# Patient Record
Sex: MALE | Race: WHITE | Employment: FULL TIME | URBAN - METROPOLITAN AREA
[De-identification: names, ages, dates, MRNs, and addresses within clinical notes are randomized per-mention and may not be internally consistent; named-entity substitution may affect disease eponyms.]

---

## 2022-11-03 ENCOUNTER — TELEPHONE (OUTPATIENT)
Dept: ONCOLOGY | Age: 57
End: 2022-11-03

## 2022-11-03 ENCOUNTER — INITIAL CONSULT (OUTPATIENT)
Dept: ONCOLOGY | Age: 57
End: 2022-11-03
Payer: MEDICARE

## 2022-11-03 VITALS
BODY MASS INDEX: 24.98 KG/M2 | DIASTOLIC BLOOD PRESSURE: 76 MMHG | WEIGHT: 184.4 LBS | SYSTOLIC BLOOD PRESSURE: 112 MMHG | HEIGHT: 72 IN | TEMPERATURE: 98 F | HEART RATE: 83 BPM

## 2022-11-03 DIAGNOSIS — C21.0 ANAL CARCINOMA (HCC): ICD-10-CM

## 2022-11-03 DIAGNOSIS — C78.7 LIVER METASTASES (HCC): Primary | ICD-10-CM

## 2022-11-03 PROCEDURE — 99245 OFF/OP CONSLTJ NEW/EST HI 55: CPT | Performed by: INTERNAL MEDICINE

## 2022-11-03 RX ORDER — LIDOCAINE AND PRILOCAINE 25; 25 MG/G; MG/G
CREAM TOPICAL
COMMUNITY
Start: 2021-12-29

## 2022-11-03 RX ORDER — CAPSAICIN 0.025 %
CREAM (GRAM) TOPICAL 2 TIMES DAILY
COMMUNITY
Start: 2022-08-18 | End: 2022-11-03 | Stop reason: CLARIF

## 2022-11-03 RX ORDER — DIPHENOXYLATE HYDROCHLORIDE AND ATROPINE SULFATE 2.5; .025 MG/1; MG/1
TABLET ORAL
COMMUNITY
Start: 2022-10-20

## 2022-11-03 RX ORDER — LOSARTAN POTASSIUM 100 MG/1
TABLET ORAL
COMMUNITY
Start: 2022-10-17

## 2022-11-03 RX ORDER — CARVEDILOL 25 MG/1
TABLET ORAL
COMMUNITY
Start: 2022-10-21

## 2022-11-03 RX ORDER — HYDROXYZINE HYDROCHLORIDE 10 MG/1
TABLET, FILM COATED ORAL 3 TIMES DAILY PRN
COMMUNITY

## 2022-11-03 RX ORDER — OXYCODONE HYDROCHLORIDE 5 MG/1
TABLET ORAL
COMMUNITY
Start: 2022-10-20

## 2022-11-03 RX ORDER — POLYETHYLENE GLYCOL 3350 17 G/17G
POWDER, FOR SOLUTION ORAL DAILY
COMMUNITY
Start: 2022-01-03

## 2022-11-03 RX ORDER — CAPSAICIN 0.025 %
CREAM (GRAM) TOPICAL
COMMUNITY
Start: 2022-08-19

## 2022-11-03 RX ORDER — BUSPIRONE HYDROCHLORIDE 15 MG/1
TABLET ORAL PRN
COMMUNITY
Start: 2021-09-07

## 2022-11-03 RX ORDER — MOMETASONE FUROATE 1 MG/G
CREAM TOPICAL DAILY
COMMUNITY
Start: 2022-09-29

## 2022-11-03 RX ORDER — ONDANSETRON HYDROCHLORIDE 8 MG/1
TABLET, FILM COATED ORAL
COMMUNITY
Start: 2022-10-17

## 2022-11-03 RX ORDER — LOPERAMIDE HYDROCHLORIDE 2 MG/1
CAPSULE ORAL
COMMUNITY
Start: 2022-08-18

## 2022-11-03 NOTE — PROGRESS NOTES
Patient ID: Callum Burciaga, 1965, [id number], 62 y.o. Referred by :   Reason for consultation:   Anal Carcinoma stage IV, with liver metastasis. Anal cancer (CMS-HCC)   1/6/2022 - Cancer Staged   Staging form: Anus, AJCC 8th Edition  - Clinical: Stage IV (cTX, cNX, pM1) - Signed by Ramon Moscoso MD on 1/6/2022   Progressive disease and plan to start FOLFOX  HISTORY OF PRESENT ILLNESS:    Oncologic History:  Callum Burciaga is a 62 y.o. male with a diagnosis of stage IV anal carcinoma was seen during initial consultation visit for second opinion. Patient has been following with medical oncology Dr. Rosa Lange at [facility].    His brief oncologic history is as follows. Patient was diagnosed with anal squamous cell carcinoma in December 2021 when he was noted to have low rectal mass with possible prostatic invasion and also his CT scan showed multiple liver metastasis, perirectal lymph nodes and adrenal metastasis. Biopsy of the anal mass in December showed squamous cell carcinoma. Subsequently patient also underwent biopsy of the liver on 1/4/2021 which confirmed metastatic squamous cell carcinoma from anal primary. The next-generation sequencing identified no targetable mutations and his MSI was stable and his tumor mutational burden low at 5 mutations/MB. Patient was started on carboplatin and Taxol every 3 weeks on 1/10/2022 and subsequent restaging scan showed good response in March. Because of the neuropathy his chemotherapy was dose reduced at cycle 5 and patient completed cycle 6 on 5/4/2022. His restaging scan in May showed stable disease. His CT abdomen pelvis on 7/22/2022 showed enlarged periaortic rectal lymph node compared to the May scan and possible 1 new liver lesion. Patient was started on Keytruda on 7/28/2022 and he received 4 cycles so far.   His restaging scan on 10/12/2022 showed Increased lymphadenopathy and size/number of hepatic lesions, concerning for progression of metastatic disease. Patient received palliative radiation therapy 1800 cGy to primary anal mass starting 10/5/2022 until 10/12/2022. His primary oncologist recommended FOLFOX    Patient works as a  in [employer] and is active physically. His ECOG performance around 1. He does complain of fatigue and pain in his anal area especially with bowel movements and also pain in the right upper quadrant which is started recently. He does not like to take pain medication and using oxycodone 5 mg only 2-3 times a day. He is not on any long-acting pain medications. He does smoke half pack per day and denies any excessive alcohol use. He does have some numbness in his bilateral feet but denies any painful neuropathy. He was prescribed Cymbalta by his oncologist but he took for 1 days and then subsequently stopped because of the side effects.     Past Medical History:   Diagnosis Date    Anal cancer ([facility]     Cardiomyopathy ([facility]     Depression     Hypertension     Liver cancer ([facility]        Past Surgical History:   Procedure Laterality Date    COLECTOMY      COLONOSCOPY W/ BIOPSIES      LIVER BIOPSY         No Known Allergies    Current Outpatient Medications   Medication Sig Dispense Refill    busPIRone (BUSPAR) 15 MG tablet Take by mouth as needed      capsaicin (ZOSTRIX) 0.025 % cream       carvedilol (COREG) 25 MG tablet       diphenoxylate-atropine (LOMOTIL) 2.5-0.025 MG per tablet       hydrocortisone 2.5 % cream Place rectally 2 times daily      hydrOXYzine HCl (ATARAX) 10 MG tablet Take by mouth 3 times daily as needed      lidocaine-prilocaine (EMLA) 2.5-2.5 % cream Apply small mound to port site 1 hr prior to accessing      loperamide (IMODIUM) 2 MG capsule       losartan (COZAAR) 100 MG tablet       mometasone (ELOCON) 0.1 % cream Apply topically daily      ondansetron (ZOFRAN) 8 MG tablet       oxyCODONE (ROXICODONE) 5 MG immediate release tablet       polyethylene glycol (GLYCOLAX) 17 GM/SCOOP powder Take by mouth daily      terbinafine (LAMISIL) 1 % cream Apply topically 2 times daily       No current facility-administered medications for this visit. Social History     Socioeconomic History    Marital status: Single     Spouse name: Not on file    Number of children: Not on file    Years of education: Not on file    Highest education level: Not on file   Occupational History    Not on file   Tobacco Use    Smoking status: Every Day     Packs/day: 0.25     Types: Cigarettes    Smokeless tobacco: Never   Substance and Sexual Activity    Alcohol use: Not Currently    Drug use: Never    Sexual activity: Not on file   Other Topics Concern    Not on file   Social History Narrative    Not on file     Social Determinants of Health     Financial Resource Strain: Not on file   Food Insecurity: Not on file   Transportation Needs: Not on file   Physical Activity: Not on file   Stress: Not on file   Social Connections: Not on file   Intimate Partner Violence: Not on file   Housing Stability: Not on file       Family History   Problem Relation Age of Onset    Heart Attack Mother     Heart Attack Father         REVIEW OF SYSTEM:     Constitutional: ++fatigue, No fever or chills. No night sweats, no weight loss   Eyes: No eye discharge, double vision, or eye pain   HEENT: negative for sore mouth, sore throat, hoarseness and voice change   Respiratory: negative for cough , sputum, dyspnea, wheezing, hemoptysis, chest pain   Cardiovascular: negative for chest pain, dyspnea, palpitations, orthopnea, PND   Gastrointestinal: negative for nausea, vomiting, diarrhea, constipation, ++abdominal pain, Dysphagia, hematemesis and hematochezia, +rectal pain  Genitourinary: negative for frequency, dysuria, nocturia, urinary incontinence, and hematuria   Integument: negative for rash, skin lesions, bruises.    Hematologic/Lymphatic: negative for easy bruising, bleeding, lymphadenopathy, petechiae and swelling/edema   Endocrine: negative for heat or cold intolerance, tremor, weight changes, change in bowel habits and hair loss   Musculoskeletal: negative for myalgias, arthralgias, pain, joint swelling,and bone pain   Neurological: negative for headaches, dizziness, seizures, weakness, numbness   OBJECTIVE:         Vitals:    11/03/22 0819   BP: 112/76   Pulse: 83   Temp: 98 °F (36.7 °C)     PHYSICAL EXAM:   General appearance - well appearing, no in pain or distress   Mental status - alert and cooperative   Eyes - pupils equal and reactive, extraocular eye movements intact   Ears - bilateral TM's and external ear canals normal   Mouth - mucous membranes moist, pharynx normal without lesions   Neck - supple, no significant adenopathy   Lymphatics - no palpable lymphadenopathy, no hepatosplenomegaly   Chest - clear to auscultation, no wheezes, rales or rhonchi, symmetric air entry   Heart - normal rate, regular rhythm, normal S1, S2, no murmurs, rubs, clicks or gallops   Abdomen - soft, +RUQ tender, nondistended, no masses or organomegaly   Neurological - alert, oriented, normal speech, no focal findings or movement disorder noted   Musculoskeletal - no joint tenderness, deformity or swelling   Extremities - peripheral pulses normal, no pedal edema, no clubbing or cyanosis   Skin - normal coloration and turgor, no rashes, no suspicious skin lesions noted   LABORATORY DATA:     Lab Results   Component Value Date    WBC 16.4 (H) 02/27/2012    HGB 10.1 (L) 02/27/2012    HCT 28.7 (L) 02/27/2012    MCV 93.0 02/27/2012     02/27/2012    LYMPHOPCT 3 (L) 02/24/2012    RBC 3.09 (L) 02/27/2012    MCH 32.7 02/27/2012    MCHC 35.2 02/27/2012    RDW 14.6 (H) 02/27/2012    MONOPCT 7 02/24/2012    BASOPCT 0 02/24/2012    NEUTROABS 15.79 (H) 02/24/2012    LYMPHSABS 0.43 (L) 02/24/2012    MONOSABS 1.30 (H) 02/24/2012    EOSABS 0.00 02/24/2012    BASOSABS 0.00 02/24/2012         Chemistry        Component Value Date/Time     02/29/2012 0556    K 4.3 02/29/2012 0556     02/29/2012 0556    CO2 27 02/29/2012 0556    BUN 19 02/29/2012 0556    CREATININE 0.71 02/29/2012 0556        Component Value Date/Time    CALCIUM 8.6 02/29/2012 0556    ALKPHOS 90 02/29/2012 0556    AST 47 (H) 02/29/2012 0556     (H) 02/29/2012 0556    BILITOT 0.60 02/29/2012 0556        PATHOLOGY DATA:   Reviewed  IMAGING DATA:    10/12/22 CT CHEST WITH CONTRAST     HISTORY:  Metastatic anal cancer. Assess response to therapy Staging restaging     COMPARISON: 7/22/2022     TECHNIQUE:  Omnipaque 300 nonionic contrast injected intravenously without reported complication. Axial images of the thorax obtained with sagittal and coronal 2-D reformatted images. Automated exposure control was used     FINDINGS:     5 mm oval-shaped nodule in the extreme right lower lobe is unchanged. Please see axial image 96. Triangular-shaped area of pleural thickening along the inferior left major fissure is also stable. This measures 6 mm. Scarring in the lingula is noted. Tiny 2 mm right middle lobe nodule is stable. See axial image 76. Changes related emphysema are appreciated below are noted. Atherosclerotic involvement of the coronary arteries is again noted. Small size pericardial effusion is appreciated anteriorly. Extensive hepatic metastatic disease is noted. Atherosclerotic disease is observed. No lymphadenopathy in the superior mediastinum is appreciated. Please see axial   image 32. This lymph node has a waist of 1.2 cm. Additional lymph nodes along the ascending aorta are appreciated     IMPRESSION:     *  Concern is raised for new metastatic lymphadenopathy in the superior mediastinum as detailed above. Consider further staging studies such as PET/CT     10/12/22 CT ABDOMEN AND PELVIS W CONT     HISTORY: Anal cancer. Surveillance. Restaging.    COMPARISON: 7/22/2022   TECHNIQUE: CT images of abdomen and pelvis obtained following administration of contrast. Automated exposure control was utilized. All CT scans at this facility use dose modulation, iterative reconstruction, and/or weight based dosing when appropriate to reduce radiation dose to as low as reasonably achievable. CONTRAST:   Oral: None   IV: 100 mL Omnipaque 350     FINDINGS:   No lower lung consolidations. No pleural effusion. No abdominal wall abnormalities. No acute osseous abnormalities. The visualized portions of the heart and pericardium are unremarkable. Moderate vascular calcifications in the abdominal aorta and iliac arteries. No aneurysms. Cirrhotic morphology of the liver. Innumerable hypoattenuating lesions throughout the liver have increased in size and number. The gallbladder, adrenal glands, spleen, and pancreas are unremarkable. The kidneys and ureters are unremarkable. The urinary bladder is under distended, but demonstrates diffusely thickened wall. Calcifications within the prostate. Status post partial colectomy. The small bowel is unremarkable. No evidence of intra-abdominal free air or bowel obstruction. Right inguinal lymph nodes have increased in size, the largest of which measures 1.3 cm in short axis (series 3, image 85). Unchanged left inguinal lymph nodes measuring up to 0.8 cm in short axis (series 3, image 93). Increased size of chuck hepatis lymph node now measuring 1.0 cm in short axis   (series 3, image 32). Retrocrural lymph nodes also increased in size and now measure up to 0.9 cm in short axis (series 3, image 20). Unchanged perirectal lymph nodes measure up to 1.0 cm in short axis (series 3, image 75). IMPRESSION:   Increased lymphadenopathy and size/number of hepatic lesions, concerning for progression of metastatic disease. ASSESSMENT:    In total, I spent greater than 80 minutes in face-to-face consultation with the patient and the majority of this time was spent in education, counseling, and coordination of care.   During our encounter, I personally reviewed the above history and evaluation, including radiology and pathology data, in detail  Lizzette Herrera is a 62 y.o. male with stage IV anal squamous cell carcinoma with progressive liver metastasis after multiple lines of treatment. I reviewed outside records, discussed NCCN guidelines and goals of care with patient. Is maintaining good performance status and still working. And therefore he is a candidate for aggressive chemotherapy. I explained that systemic chemotherapy is palliative in nature and is not curable with a goal of maintaining quality of life and in the process to help improve survival.    His next-generation sequencing did not show any targetable mutation and his MSI stable and tumor mutational burden is not high therefore immunotherapy by itself has less chance of working. I explained that with each line of chemotherapy treatment response rate does goes down. I discussed  Option of treatment including FOLFCIS, DCF and m FOLFOX with Avastin. Given that patient has mild renal dysfunction he may not be appropriate for cisplatin and therefore modified FOLFOX regimen would be reasonable. I would recommend adding Avastin based on (Mulu rizzo l, Case Rep Oncol. 2016 Maria Alejandra Townsend; 9(1): [phone]) and NCCN rolando. Patient's questions were sought and answered to his satisfaction and he agreed to proceed with the plan.   PLAN:   I reviewed laboratory data, imaging studies, discussed NCCN guidelines goals of care  I explained that in my opinion modified FOLFOX regimen is reasonable option and may add Avastin  Given his pre-existing neuropathy the oxaliplatin dose probably need to be watched and slightly reduced  Also will recommend adding Lyrica for his neuropathy  Patient could follow-up with his primary oncologist for further care  I also explained option of exploring clinical trial either at tertiary care  Will be happy to see him in future if needed  Patient is agreeable with the plan      Heber Liang MD  Hematologist/Medical Oncologist    This note is created with the assistance of a speech recognition program.  While intending to generate a document that actually reflects the content of the visit, the document can still have some errors including those of syntax and sound a like substitutions which may escape proof reading. It such instances, actual meaning can be extrapolated by contextual diversion.

## 2023-01-05 ENCOUNTER — HOSPITAL ENCOUNTER (OUTPATIENT)
Age: 58
Setting detail: SPECIMEN
Discharge: HOME | End: 2023-01-05
Payer: MEDICARE

## 2023-01-05 LAB
ANION GAP SERPL CALCULATED.3IONS-SCNC: 15 MMOL/L (ref 9–17)
BUN BLDV-MCNC: 55 MG/DL (ref 6–20)
BUN/CREAT BLD: 35 (ref 9–20)
CALCIUM SERPL-MCNC: 9.1 MG/DL (ref 8.6–10.4)
CHLORIDE BLD-SCNC: 103 MMOL/L (ref 98–107)
CO2: 17 MMOL/L (ref 20–31)
CREAT SERPL-MCNC: 1.59 MG/DL (ref 0.7–1.2)
GFR SERPL CREATININE-BSD FRML MDRD: 50 ML/MIN/1.73M2
GLUCOSE BLD-MCNC: 133 MG/DL (ref 70–99)
HCT VFR BLD CALC: 29.8 % (ref 40.7–50.3)
HEMOGLOBIN: 8.8 G/DL (ref 13–17)
MCH RBC QN AUTO: 28.8 PG (ref 25.2–33.5)
MCHC RBC AUTO-ENTMCNC: 29.5 G/DL (ref 28–38)
MCV RBC AUTO: 97.4 FL (ref 82.6–102.9)
PDW BLD-RTO: 19.8 % (ref 11.8–14.4)
PLATELET # BLD: 240 K/UL (ref 138–453)
PMV BLD AUTO: 10.9 FL (ref 8.1–13.5)
POTASSIUM SERPL-SCNC: 4.6 MMOL/L (ref 3.7–5.3)
RBC # BLD: 3.06 M/UL (ref 4.21–5.77)
SODIUM BLD-SCNC: 135 MMOL/L (ref 135–144)
WBC # BLD: 11.2 K/UL (ref 3.5–11.3)

## 2023-01-05 PROCEDURE — 80048 BASIC METABOLIC PNL TOTAL CA: CPT

## 2023-01-05 PROCEDURE — 36415 COLL VENOUS BLD VENIPUNCTURE: CPT

## 2023-01-05 PROCEDURE — P9603 ONE-WAY ALLOW PRORATED MILES: HCPCS

## 2023-01-05 PROCEDURE — 85027 COMPLETE CBC AUTOMATED: CPT
